# Patient Record
Sex: MALE | Race: WHITE | HISPANIC OR LATINO | URBAN - METROPOLITAN AREA
[De-identification: names, ages, dates, MRNs, and addresses within clinical notes are randomized per-mention and may not be internally consistent; named-entity substitution may affect disease eponyms.]

---

## 2021-01-26 ENCOUNTER — EMERGENCY (EMERGENCY)
Facility: HOSPITAL | Age: 41
LOS: 1 days | Discharge: HOME | End: 2021-01-26
Attending: STUDENT IN AN ORGANIZED HEALTH CARE EDUCATION/TRAINING PROGRAM
Payer: MEDICAID

## 2021-01-26 VITALS
HEIGHT: 64 IN | OXYGEN SATURATION: 96 % | DIASTOLIC BLOOD PRESSURE: 86 MMHG | WEIGHT: 149.91 LBS | TEMPERATURE: 97 F | HEART RATE: 84 BPM | SYSTOLIC BLOOD PRESSURE: 152 MMHG | RESPIRATION RATE: 18 BRPM

## 2021-01-26 PROCEDURE — 99053 MED SERV 10PM-8AM 24 HR FAC: CPT

## 2021-01-26 PROCEDURE — 99283 EMERGENCY DEPT VISIT LOW MDM: CPT

## 2021-01-26 RX ORDER — IBUPROFEN 200 MG
600 TABLET ORAL ONCE
Refills: 0 | Status: COMPLETED | OUTPATIENT
Start: 2021-01-26 | End: 2021-01-26

## 2021-01-26 NOTE — ED ADULT TRIAGE NOTE - CHIEF COMPLAINT QUOTE
Feels like something stuck in his throat after drinking chicken soup at 1500 today.  Tried a shot of bandar at 1500 but it didn't work.  He denies any additional alcohol. Denies drinking alcohol daily.  Alert and oriented X 4.  Oxygen saturation 96%

## 2021-01-27 ENCOUNTER — INPATIENT (INPATIENT)
Facility: HOSPITAL | Age: 41
LOS: 1 days | Discharge: HOME | DRG: 305 | End: 2021-01-29
Attending: HOSPITALIST | Admitting: HOSPITALIST
Payer: MEDICAID

## 2021-01-27 VITALS
HEART RATE: 78 BPM | OXYGEN SATURATION: 98 % | SYSTOLIC BLOOD PRESSURE: 181 MMHG | TEMPERATURE: 98 F | DIASTOLIC BLOOD PRESSURE: 105 MMHG | RESPIRATION RATE: 20 BRPM

## 2021-01-27 DIAGNOSIS — I10 ESSENTIAL (PRIMARY) HYPERTENSION: ICD-10-CM

## 2021-01-27 LAB
ALBUMIN SERPL ELPH-MCNC: 5 G/DL — SIGNIFICANT CHANGE UP (ref 3.3–5.2)
ALP SERPL-CCNC: 72 U/L — SIGNIFICANT CHANGE UP (ref 40–120)
ALT FLD-CCNC: 21 U/L — SIGNIFICANT CHANGE UP
ANION GAP SERPL CALC-SCNC: 11 MMOL/L — SIGNIFICANT CHANGE UP (ref 5–17)
AST SERPL-CCNC: 28 U/L — SIGNIFICANT CHANGE UP
BASOPHILS # BLD AUTO: 0.02 K/UL — SIGNIFICANT CHANGE UP (ref 0–0.2)
BASOPHILS NFR BLD AUTO: 0.4 % — SIGNIFICANT CHANGE UP (ref 0–2)
BILIRUB SERPL-MCNC: 0.7 MG/DL — SIGNIFICANT CHANGE UP (ref 0.4–2)
BUN SERPL-MCNC: 8 MG/DL — SIGNIFICANT CHANGE UP (ref 8–20)
CALCIUM SERPL-MCNC: 9.6 MG/DL — SIGNIFICANT CHANGE UP (ref 8.6–10.2)
CHLORIDE SERPL-SCNC: 99 MMOL/L — SIGNIFICANT CHANGE UP (ref 98–107)
CO2 SERPL-SCNC: 27 MMOL/L — SIGNIFICANT CHANGE UP (ref 22–29)
CREAT SERPL-MCNC: 0.6 MG/DL — SIGNIFICANT CHANGE UP (ref 0.5–1.3)
EOSINOPHIL # BLD AUTO: 0.01 K/UL — SIGNIFICANT CHANGE UP (ref 0–0.5)
EOSINOPHIL NFR BLD AUTO: 0.2 % — SIGNIFICANT CHANGE UP (ref 0–6)
GLUCOSE SERPL-MCNC: 106 MG/DL — HIGH (ref 70–99)
HCT VFR BLD CALC: 43.1 % — SIGNIFICANT CHANGE UP (ref 39–50)
HGB BLD-MCNC: 14.8 G/DL — SIGNIFICANT CHANGE UP (ref 13–17)
IMM GRANULOCYTES NFR BLD AUTO: 0.2 % — SIGNIFICANT CHANGE UP (ref 0–1.5)
LYMPHOCYTES # BLD AUTO: 0.65 K/UL — LOW (ref 1–3.3)
LYMPHOCYTES # BLD AUTO: 13.1 % — SIGNIFICANT CHANGE UP (ref 13–44)
MCHC RBC-ENTMCNC: 33.3 PG — SIGNIFICANT CHANGE UP (ref 27–34)
MCHC RBC-ENTMCNC: 34.3 GM/DL — SIGNIFICANT CHANGE UP (ref 32–36)
MCV RBC AUTO: 97.1 FL — SIGNIFICANT CHANGE UP (ref 80–100)
MONOCYTES # BLD AUTO: 0.49 K/UL — SIGNIFICANT CHANGE UP (ref 0–0.9)
MONOCYTES NFR BLD AUTO: 9.8 % — SIGNIFICANT CHANGE UP (ref 2–14)
NEUTROPHILS # BLD AUTO: 3.8 K/UL — SIGNIFICANT CHANGE UP (ref 1.8–7.4)
NEUTROPHILS NFR BLD AUTO: 76.3 % — SIGNIFICANT CHANGE UP (ref 43–77)
PLATELET # BLD AUTO: 171 K/UL — SIGNIFICANT CHANGE UP (ref 150–400)
POTASSIUM SERPL-MCNC: 3.9 MMOL/L — SIGNIFICANT CHANGE UP (ref 3.5–5.3)
POTASSIUM SERPL-SCNC: 3.9 MMOL/L — SIGNIFICANT CHANGE UP (ref 3.5–5.3)
PROT SERPL-MCNC: 7.4 G/DL — SIGNIFICANT CHANGE UP (ref 6.6–8.7)
RBC # BLD: 4.44 M/UL — SIGNIFICANT CHANGE UP (ref 4.2–5.8)
RBC # FLD: 11.9 % — SIGNIFICANT CHANGE UP (ref 10.3–14.5)
SARS-COV-2 RNA SPEC QL NAA+PROBE: SIGNIFICANT CHANGE UP
SODIUM SERPL-SCNC: 137 MMOL/L — SIGNIFICANT CHANGE UP (ref 135–145)
WBC # BLD: 4.98 K/UL — SIGNIFICANT CHANGE UP (ref 3.8–10.5)
WBC # FLD AUTO: 4.98 K/UL — SIGNIFICANT CHANGE UP (ref 3.8–10.5)

## 2021-01-27 PROCEDURE — U0003: CPT

## 2021-01-27 PROCEDURE — U0005: CPT

## 2021-01-27 PROCEDURE — 93010 ELECTROCARDIOGRAM REPORT: CPT

## 2021-01-27 PROCEDURE — 99285 EMERGENCY DEPT VISIT HI MDM: CPT

## 2021-01-27 PROCEDURE — 99283 EMERGENCY DEPT VISIT LOW MDM: CPT

## 2021-01-27 PROCEDURE — 99222 1ST HOSP IP/OBS MODERATE 55: CPT

## 2021-01-27 PROCEDURE — 71045 X-RAY EXAM CHEST 1 VIEW: CPT | Mod: 26

## 2021-01-27 PROCEDURE — 87631 RESP VIRUS 3-5 TARGETS: CPT

## 2021-01-27 RX ORDER — LABETALOL HCL 100 MG
10 TABLET ORAL ONCE
Refills: 0 | Status: COMPLETED | OUTPATIENT
Start: 2021-01-27 | End: 2021-01-27

## 2021-01-27 RX ADMIN — Medication 600 MILLIGRAM(S): at 00:08

## 2021-01-27 RX ADMIN — Medication 50 MILLIGRAM(S): at 14:29

## 2021-01-27 RX ADMIN — Medication 10 MILLIGRAM(S): at 18:17

## 2021-01-27 RX ADMIN — Medication 2 MILLIGRAM(S): at 14:57

## 2021-01-27 RX ADMIN — Medication 2 MILLIGRAM(S): at 17:31

## 2021-01-27 RX ADMIN — Medication 30 MILLILITER(S): at 00:08

## 2021-01-27 NOTE — ED PROVIDER NOTE - OBJECTIVE STATEMENT
41yo M denies PMH hasn't seen a doctor in 15y presents with dizziness, weakness, blurred vision for 20min this morning, now resolved. Reports he was sitting down when symptoms started. Resolved spontaneously. Denies f/ch, CP, SOB, n/v, abdominal pain. Former smoker, quit a year ago. Reports drinking "a couple beers on weekends", hx illicit drug use but reports he hasn't used in 10 years. 41yo M denies PMH hasn't seen a doctor in 15y presents with dizziness, weakness, blurred vision for 20min this morning, now resolved. Reports he was sitting down when symptoms started. Resolved spontaneously. Denies f/ch, CP, SOB, n/v, abdominal pain. Former smoker, quit a year ago. Reports binge drinking beers on the weekends, last drink Monday night. Reports history of illicit drug use but reports he hasn't used in 10 years.

## 2021-01-27 NOTE — ED PROVIDER NOTE - CLINICAL SUMMARY MEDICAL DECISION MAKING FREE TEXT BOX
41yo M denies PMH hasn't seen a doctor in 15y presents with dizziness, weakness, blurred vision for 20min this morning, now resolved. Tongue fasciculations, tremor on extension, /105, borderline tachycardic at HR 93 shows clinical picture of EtOH withdrawal. Ordered librium. Basic labs due to abscen 39yo M denies PMH hasn't seen a doctor in 15y presents with dizziness, weakness, blurred vision for 20min this morning, now resolved. Tongue fasciculations, tremor on extension, /105, borderline tachycardic at HR 93 shows clinical picture of EtOH withdrawal. Ordered librium. Basic labs due to absence of primary care. COVID swab due to known exposure.

## 2021-01-27 NOTE — ED PROVIDER NOTE - PHYSICAL EXAMINATION
Const: Awake, alert and oriented. In no acute distress. Well appearing.  HEENT: NC/AT. Moist mucous membranes. throat: pharynx clear uuvla is midline, no foreign body noted tolerating secretions not drooling   Eyes: No scleral icterus. EOMI.  Neck:. Soft and supple. Full ROM without pain.  Cardiac: +S1/S2. No murmurs. Peripheral pulses 2+ and symmetric. No LE edema.  Resp: Speaking in full sentences. No evidence of respiratory distress. No wheezes, rales or rhonchi.  Abd: Soft, non-tender, non-distended. Normal bowel sounds in all 4 quadrants. No guarding or rebound.  Back: Spine midline and non-tender. No CVAT.  Skin: No rashes, abrasions or lacerations.  Lymph: No cervical lymphadenopathy.  Neuro: Awake, alert & oriented x 3. Moves all extremities symmetrically.

## 2021-01-27 NOTE — ED PROVIDER NOTE - ATTENDING CONTRIBUTION TO CARE
39 yo male presents after having an abnormal sensation in his throat after dinner. I personally saw the patient with the PA, and completed the key components of the history and physical exam. I then discussed the management plan with the PA.

## 2021-01-27 NOTE — ED PROVIDER NOTE - PHYSICAL EXAMINATION
General: well appearing, NAD  Head: NC/AT  Cardiac: RRR, no m/r/g  Respiratory: CTABL, equal chest wall expansions, no conversational dyspnea  Abdomen: soft, ND, NT  Neuro: AAOx3, +tongue fasciculations, +tremor on arm extension, CN II-XII grossly intact, sensation intact, 5/5 strength  Psych: calm, cooperative, normal affect, answering questions appropriately, clear speech  Skin: warm and dry

## 2021-01-27 NOTE — H&P ADULT - NSHPLABSRESULTS_GEN_ALL_CORE
14.8   4.98  )-----------( 171      ( 27 Jan 2021 14:02 )             43.1       01-27    137  |  99  |  8.0  ----------------------------<  106<H>  3.9   |  27.0  |  0.60    Ca    9.6      27 Jan 2021 14:02    TPro  7.4  /  Alb  5.0  /  TBili  0.7  /  DBili  x   /  AST  28  /  ALT  21  /  AlkPhos  72  01-27        Lactate Trend    CAPILLARY BLOOD GLUCOSE        RADIOLOGY, EKG & ADDITIONAL TESTS: Reviewed.     cxr- clear lungs

## 2021-01-27 NOTE — ED PROVIDER NOTE - CARE PLAN
Principal Discharge DX:	Uncontrolled hypertension  Secondary Diagnosis:	Alcohol withdrawal syndrome without complication

## 2021-01-27 NOTE — ED ADULT NURSE NOTE - OBJECTIVE STATEMENT
Assumed care at 1308 pt co felling a discomfort to his throat, dizziness, SOB and chills today while getting ready to go to work. Pt also co epigastric pain since yesterday. pt denies any chest pain.

## 2021-01-27 NOTE — ED PROVIDER NOTE - PATIENT PORTAL LINK FT
You can access the FollowMyHealth Patient Portal offered by [facility] by registering at the following website: [email or web address]. By joining [employer]’s FollowMyHealth portal, you will also be able to view your health information using other applications (apps) compatible with our system.

## 2021-01-27 NOTE — ED ADULT TRIAGE NOTE - CHIEF COMPLAINT QUOTE
pt a+ox3, BIBA from home c/o flu like symptoms since yesterday. denies sick contacts but reports co-worker tested positive.

## 2021-01-27 NOTE — H&P ADULT - NSHPREVIEWOFSYSTEMS_GEN_ALL_CORE
REVIEW OF SYSTEMS:    CONSTITUTIONAL: No weakness, fevers or chills  EYES/ENT:   No vertigo or throat pain   NECK: No pain or stiffness  RESPIRATORY: No cough, wheezing, hemoptysis; No shortness of breath  CARDIOVASCULAR: No chest pain or palpitations  GASTROINTESTINAL: No abdominal or epigastric pain. No nausea, vomiting, or hematemesis; No diarrhea or constipation. No melena or hematochezia.  GENITOURINARY: No dysuria, frequency or hematuria  NEUROLOGICAL: No numbness or weakness  SKIN: No itching, burning, rashes, or lesions   All other review of systems is negative unless indicated above.

## 2021-01-27 NOTE — H&P ADULT - ASSESSMENT
40M no known pmh presented to ER with dizziness, blurry vision, presyncope found to have hypertensive urgency, possibly etoh withdrawal.        #Hypertension  -improved with IV labetalol  -will start on amlodipine in am  -needs outpatient follow up    #ETOH abuse  -contributing to symptoms, possible withdrawal  -currently scoring low, last drink 2 days ago  -will start on symptom triggered ciwa, if ciwa becomes elevated will start on taper  -seizure and fall precaustions  -hydration    #DVT ppx- none, low risk

## 2021-01-27 NOTE — H&P ADULT - NSHPSOCIALHISTORY_GEN_ALL_CORE
former smoker, quit 1 year ago  former cocaine use, denies use in last 6-7 months  binge drinks on weekends, 8-10 drinks on weekend, but denies drinking during the week.

## 2021-01-27 NOTE — ED ADULT NURSE REASSESSMENT NOTE - NS ED NURSE REASSESS COMMENT FT1
pt in no apparent distress, denies any pain at the moment, plan of care explained, pt verbalized understanding.

## 2021-01-27 NOTE — H&P ADULT - HISTORY OF PRESENT ILLNESS
40M no known pmh presented to ER with dizziness, blurry vision, presyncope. Patient states drinks heavily on weekends. He was drinking this weekend about 8-10 drinks on Saturday and Sunday. Last drink was Monday. He went to work earlier today and suddenly felt dizzy, blurry vision and like he was about to faint. He states didn't eat or drink prior to going to work. He tried to drink some tea to feel better, but continued to feel dizzy so came to ER.     In ER, pt found to be hypertensive to 238/119. Pt initially treated for etoh withdrawal with librium 50 and ativan 2 mg x2. Pt given labetalol 10 mg IV with improvement of blood pressure. Since coming to ER he states he is no longer symptomatic. He states never had etoh withdrawal before, but had similar symptoms two weeks ago after a weekend  40M no known pmh presented to ER with dizziness, blurry vision, presyncope. Patient states drinks heavily on weekends. He was drinking this weekend about 8-10 drinks on Saturday and Sunday. Last drink was Monday. He went to work earlier today and suddenly felt dizzy, blurry vision and like he was about to faint. He states didn't eat or drink prior to going to work. He tried to drink some tea to feel better, but continued to feel dizzy so came to ER.     In ER, pt found to be hypertensive to 238/119. Pt initially treated for etoh withdrawal with librium 50 and ativan 2 mg x2. Pt given labetalol 10 mg IV with improvement of blood pressure. Since coming to ER he states he is no longer symptomatic. He states never had etoh withdrawal before, but had similar symptoms two weeks ago after a weekend of binge drinking.

## 2021-01-27 NOTE — ED PROVIDER NOTE - OBJECTIVE STATEMENT
DISPLAY PLAN FREE TEXT pt is a 39 y/o male presenting to the ed for evaluation. pt states he ate noodle soup, felt that he "choked on the soup" and shortly after started experiencing a itchiness in his throat. pt denies any meat being in the soup/meat bones. pt is tolerating liquids and solids without difficulty. pt requesting covid swab at this time. pt denies cp, sob, headache neck pain visual changes abd pain nausea vomiting numbness or loss of sensation back pain DISPLAY PLAN FREE TEXT DISPLAY PLAN FREE TEXT DISPLAY PLAN FREE TEXT DISPLAY PLAN FREE TEXT DISPLAY PLAN FREE TEXT DISPLAY PLAN FREE TEXT DISPLAY PLAN FREE TEXT

## 2021-01-27 NOTE — H&P ADULT - NSHPPHYSICALEXAM_GEN_ALL_CORE
Vital Signs Last 24 Hrs  T(C): 37 (27 Jan 2021 19:50), Max: 37 (27 Jan 2021 19:50)  T(F): 98.6 (27 Jan 2021 19:50), Max: 98.6 (27 Jan 2021 19:50)  HR: 70 (27 Jan 2021 21:44) (70 - 104)  BP: 124/80 (27 Jan 2021 21:44) (124/80 - 238/116)  BP(mean): --  RR: 18 (27 Jan 2021 21:44) (18 - 20)  SpO2: 99% (27 Jan 2021 21:44) (98% - 99%)    GENERAL: NAD, calm  HEENT:  Atraumatic, Normocephalic, MMM, no oropharyngeal lesions  EYES: EOMI, PERRLA, conjunctival injection  NECK: Supple, No JVD, no throat tenderness.  CHEST/LUNG: Clear to auscultation bilaterally; No wheezes, rales, or rhonchi  HEART: Regular rate and rhythm; No murmurs, rubs, or gallops  ABDOMEN: Soft, Nontender, Nondistended; Bowel sounds present  EXTREMITIES:  2+ Peripheral Pulses, No clubbing, cyanosis, or edema  PSYCH: AAOx3, normal affect  NEUROLOGY: moves all extremities spontaneously. no sensory deficits, no tremor, no pronator drift  SKIN: No rashes or lesions

## 2021-01-27 NOTE — ED PROVIDER NOTE - PROGRESS NOTE DETAILS
Reassessed patient, he is feeling well, denies any current symptoms. Tremor and tongue fasciculations resolved with 2 doses of 2mg ativan, but patient remains hypertensive to 205/112.

## 2021-01-27 NOTE — ED PROVIDER NOTE - NS ED ROS FT
Constitutional: no fever, sweats, and no chills.  Eyes: no pain, no redness, and +visual changes.  ENMT: no neck pain, no stiffness  CV: no chest pain, no edema.  Resp: no cough, no dyspnea  GI: no abdominal pain, no nausea and no vomiting.  : no dysuria, no hematuria  MSK: no msk pain  Skin: no lesions, and no rashes.  Neuro: no LOC, no headache, no sensory deficits, and + weakness.

## 2021-01-28 LAB
SARS-COV-2 IGG SERPL QL IA: POSITIVE
SARS-COV-2 IGM SERPL IA-ACNC: 7.46 INDEX — HIGH

## 2021-01-28 PROCEDURE — 99233 SBSQ HOSP IP/OBS HIGH 50: CPT

## 2021-01-28 RX ORDER — AMLODIPINE BESYLATE 2.5 MG/1
5 TABLET ORAL DAILY
Refills: 0 | Status: DISCONTINUED | OUTPATIENT
Start: 2021-01-28 | End: 2021-01-29

## 2021-01-28 RX ORDER — INFLUENZA VIRUS VACCINE 15; 15; 15; 15 UG/.5ML; UG/.5ML; UG/.5ML; UG/.5ML
0.5 SUSPENSION INTRAMUSCULAR ONCE
Refills: 0 | Status: COMPLETED | OUTPATIENT
Start: 2021-01-28 | End: 2021-01-29

## 2021-01-28 RX ORDER — SODIUM CHLORIDE 9 MG/ML
1000 INJECTION, SOLUTION INTRAVENOUS
Refills: 0 | Status: DISCONTINUED | OUTPATIENT
Start: 2021-01-28 | End: 2021-01-29

## 2021-01-28 RX ADMIN — AMLODIPINE BESYLATE 5 MILLIGRAM(S): 2.5 TABLET ORAL at 06:30

## 2021-01-28 RX ADMIN — SODIUM CHLORIDE 100 MILLILITER(S): 9 INJECTION, SOLUTION INTRAVENOUS at 00:37

## 2021-01-28 RX ADMIN — SODIUM CHLORIDE 100 MILLILITER(S): 9 INJECTION, SOLUTION INTRAVENOUS at 22:22

## 2021-01-28 NOTE — SBIRT NOTE ADULT - NSSBIRTBRIEFINTDET_GEN_A_CORE
used: Oj #[id number] // Provided SBIRT services: Full screen positive. Brief Intervention Performed. Screening results were reviewed with the patient and patient was provided information about healthy guidelines and potential negative consequences associated with level of risk. Motivation and readiness to reduce or stop use was discussed and goals and activities to make changes were suggested/offered.

## 2021-01-28 NOTE — PROGRESS NOTE ADULT - SUBJECTIVE AND OBJECTIVE BOX
SARAH ALEXANDER    [id number]    40y      Male    CC: dizziness    INTERVAL HPI/OVERNIGHT EVENTS: pt seen and examined with . denies blurred vision, ha, dizziness, cp, sob, abd pain, n/v. +small amount loose stool    REVIEW OF SYSTEMS:    CONSTITUTIONAL: No fever, weight loss  RESPIRATORY: No cough, wheezing, hemoptysis; No shortness of breath  CARDIOVASCULAR: No chest pain, palpitations  GASTROINTESTINAL: No abdominal or epigastric pain. No nausea, vomiting  NEUROLOGICAL: No headaches, memory loss, loss of strength.    Vital Signs Last 24 Hrs  T(C): 36.4 (28 Jan 2021 15:46), Max: 37 (27 Jan 2021 19:50)  T(F): 97.5 (28 Jan 2021 15:46), Max: 98.6 (27 Jan 2021 19:50)  HR: 76 (28 Jan 2021 15:46) (70 - 84)  BP: 118/67 (28 Jan 2021 15:46) (118/67 - 180/100)  BP(mean): --  RR: 18 (28 Jan 2021 15:46) (18 - 20)  SpO2: 97% (28 Jan 2021 15:46) (96% - 99%)    PHYSICAL EXAM:    GENERAL: NAD  HEENT: +EOMI  NECK: soft, supple  CHEST/LUNG: Clear to auscultation bilaterally; No wheezing  HEART: S1S2+, Regular rate and rhythm; No murmurs, rubs, or gallops  ABDOMEN: Soft, Nontender, Nondistended; Bowel sounds present  SKIN: warm, dry  NEURO: AAOX3, no focal deficits, no motor or sensory loss  PSYCH: normal affect     LABS:                        14.8   4.98  )-----------( 171      ( 27 Jan 2021 14:02 )             43.1     01-27    137  |  99  |  8.0  ----------------------------<  106<H>  3.9   |  27.0  |  0.60    Ca    9.6      27 Jan 2021 14:02    TPro  7.4  /  Alb  5.0  /  TBili  0.7  /  DBili  x   /  AST  28  /  ALT  21  /  AlkPhos  72  01-27        MEDICATIONS  (STANDING):  amLODIPine   Tablet 5 milliGRAM(s) Oral daily  sodium chloride 0.9% 1000 milliLiter(s) IV Continuous     MEDICATIONS  (PRN):  LORazepam   Injectable 2 milliGRAM(s) IV Push every 2 hours PRN CIWA-Ar score increase by 2 points and a total score of 7 or less  LORazepam   Injectable 2 milliGRAM(s) IV Push every 1 hour PRN CIWA-Ar score 8 or greater      RADIOLOGY & ADDITIONAL TESTS:

## 2021-01-28 NOTE — PROGRESS NOTE ADULT - ASSESSMENT
40y/oM no known PMH presenting with dizziness, blurred vision, presyncope, found to have hypertensive urgency, possible EtOH withdrawal     Hypertension   -s/p IV labetalol   -cont amlodipine   -pt has not seen any physicians, will need outpt f/u     EtOH abuse   -cont IVF x1 day   -symptom-triggered CIWA as needed   -seizure and fall  40y/oM no known PMH presenting with dizziness, blurred vision, presyncope, found to have hypertensive urgency, possible EtOH withdrawal     Hypertension   -s/p IV labetalol   -cont amlodipine   -pt has not seen any physicians, will need outpt f/u     EtOH abuse   -cont IVF x1 day   -symptom-triggered CIWA as needed   -seizure and fall precautions     dispo: pending improvement in BP, poss next 24-48hrs

## 2021-01-29 VITALS
DIASTOLIC BLOOD PRESSURE: 78 MMHG | OXYGEN SATURATION: 96 % | RESPIRATION RATE: 18 BRPM | HEART RATE: 82 BPM | SYSTOLIC BLOOD PRESSURE: 123 MMHG | TEMPERATURE: 97 F

## 2021-01-29 LAB
A1C WITH ESTIMATED AVERAGE GLUCOSE RESULT: 5.2 % — SIGNIFICANT CHANGE UP (ref 4–5.6)
ALBUMIN SERPL ELPH-MCNC: 4.1 G/DL — SIGNIFICANT CHANGE UP (ref 3.3–5.2)
ALP SERPL-CCNC: 59 U/L — SIGNIFICANT CHANGE UP (ref 40–120)
ALT FLD-CCNC: 17 U/L — SIGNIFICANT CHANGE UP
ANION GAP SERPL CALC-SCNC: 13 MMOL/L — SIGNIFICANT CHANGE UP (ref 5–17)
AST SERPL-CCNC: 19 U/L — SIGNIFICANT CHANGE UP
BILIRUB SERPL-MCNC: 1 MG/DL — SIGNIFICANT CHANGE UP (ref 0.4–2)
BUN SERPL-MCNC: 9 MG/DL — SIGNIFICANT CHANGE UP (ref 8–20)
CALCIUM SERPL-MCNC: 8.7 MG/DL — SIGNIFICANT CHANGE UP (ref 8.6–10.2)
CHLORIDE SERPL-SCNC: 102 MMOL/L — SIGNIFICANT CHANGE UP (ref 98–107)
CHOLEST SERPL-MCNC: 142 MG/DL — SIGNIFICANT CHANGE UP
CO2 SERPL-SCNC: 25 MMOL/L — SIGNIFICANT CHANGE UP (ref 22–29)
CREAT SERPL-MCNC: 0.59 MG/DL — SIGNIFICANT CHANGE UP (ref 0.5–1.3)
ESTIMATED AVERAGE GLUCOSE: 103 MG/DL — SIGNIFICANT CHANGE UP (ref 68–114)
GLUCOSE SERPL-MCNC: 88 MG/DL — SIGNIFICANT CHANGE UP (ref 70–99)
HCT VFR BLD CALC: 43.1 % — SIGNIFICANT CHANGE UP (ref 39–50)
HDLC SERPL-MCNC: 51 MG/DL — SIGNIFICANT CHANGE UP
HGB BLD-MCNC: 14.4 G/DL — SIGNIFICANT CHANGE UP (ref 13–17)
LIPID PNL WITH DIRECT LDL SERPL: 75 MG/DL — SIGNIFICANT CHANGE UP
MAGNESIUM SERPL-MCNC: 2.2 MG/DL — SIGNIFICANT CHANGE UP (ref 1.6–2.6)
MCHC RBC-ENTMCNC: 33.1 PG — SIGNIFICANT CHANGE UP (ref 27–34)
MCHC RBC-ENTMCNC: 33.4 GM/DL — SIGNIFICANT CHANGE UP (ref 32–36)
MCV RBC AUTO: 99.1 FL — SIGNIFICANT CHANGE UP (ref 80–100)
NON HDL CHOLESTEROL: 91 MG/DL — SIGNIFICANT CHANGE UP
PLATELET # BLD AUTO: 148 K/UL — LOW (ref 150–400)
POTASSIUM SERPL-MCNC: 4.2 MMOL/L — SIGNIFICANT CHANGE UP (ref 3.5–5.3)
POTASSIUM SERPL-SCNC: 4.2 MMOL/L — SIGNIFICANT CHANGE UP (ref 3.5–5.3)
PROT SERPL-MCNC: 6.6 G/DL — SIGNIFICANT CHANGE UP (ref 6.6–8.7)
RBC # BLD: 4.35 M/UL — SIGNIFICANT CHANGE UP (ref 4.2–5.8)
RBC # FLD: 11.8 % — SIGNIFICANT CHANGE UP (ref 10.3–14.5)
SODIUM SERPL-SCNC: 140 MMOL/L — SIGNIFICANT CHANGE UP (ref 135–145)
TRIGL SERPL-MCNC: 78 MG/DL — SIGNIFICANT CHANGE UP
WBC # BLD: 3.97 K/UL — SIGNIFICANT CHANGE UP (ref 3.8–10.5)
WBC # FLD AUTO: 3.97 K/UL — SIGNIFICANT CHANGE UP (ref 3.8–10.5)

## 2021-01-29 PROCEDURE — 99239 HOSP IP/OBS DSCHRG MGMT >30: CPT

## 2021-01-29 RX ORDER — AMLODIPINE BESYLATE 2.5 MG/1
1 TABLET ORAL
Qty: 30 | Refills: 0
Start: 2021-01-29 | End: 2021-02-27

## 2021-01-29 RX ADMIN — AMLODIPINE BESYLATE 5 MILLIGRAM(S): 2.5 TABLET ORAL at 05:07

## 2021-01-29 RX ADMIN — INFLUENZA VIRUS VACCINE 0.5 MILLILITER(S): 15; 15; 15; 15 SUSPENSION INTRAMUSCULAR at 12:30

## 2021-01-29 NOTE — DISCHARGE NOTE PROVIDER - NSDCCPCAREPLAN_GEN_ALL_CORE_FT
PRINCIPAL DISCHARGE DIAGNOSIS  Diagnosis: Uncontrolled hypertension  Assessment and Plan of Treatment:       SECONDARY DISCHARGE DIAGNOSES  Diagnosis: Alcohol withdrawal syndrome without complication  Assessment and Plan of Treatment:

## 2021-01-29 NOTE — DISCHARGE NOTE PROVIDER - HOSPITAL COURSE
40y/oM no known PMH presenting with dizziness, blurred vision, presyncope, found to have hypertensive urgency, possible EtOH withdrawal. Started on symptom-triggered CIWA, however, not exhibiting further withdrawal symptoms. alcohol cessation encouraged. BP better controlled. Pt stable for discharge home.     Vital Signs Last 24 Hrs  T(C): 36.1 (29 Jan 2021 07:51), Max: 36.7 (29 Jan 2021 05:00)  T(F): 97 (29 Jan 2021 07:51), Max: 98 (29 Jan 2021 05:00)  HR: 82 (29 Jan 2021 07:51) (66 - 82)  BP: 123/78 (29 Jan 2021 07:51) (118/67 - 140/78)  BP(mean): --  RR: 18 (29 Jan 2021 07:51) (18 - 18)  SpO2: 96% (29 Jan 2021 07:51) (95% - 97%)    GENERAL: NAD  HEENT: +EOMI  NECK: soft, supple  CHEST/LUNG: Clear to auscultation bilaterally; No wheezing  HEART: S1S2+, Regular rate and rhythm; No murmurs, rubs, or gallops  ABDOMEN: Soft, Nontender, Nondistended; Bowel sounds present  SKIN: warm, dry  NEURO: AAOX3, no focal deficits, no motor or sensory loss  PSYCH: normal affect     32 minutes spent on discharge

## 2021-01-29 NOTE — DISCHARGE NOTE NURSING/CASE MANAGEMENT/SOCIAL WORK - PATIENT PORTAL LINK FT
You can access the FollowMyHealth Patient Portal offered by [facility] by registering at the following website: [email or web address]. By joining [employer]’s FollowMyHealth portal, you will also be able to view your health information using other applications (apps) compatible with our system.

## 2021-04-11 ENCOUNTER — EMERGENCY (EMERGENCY)
Facility: HOSPITAL | Age: 41
LOS: 1 days | Discharge: HOME | End: 2021-04-11
Attending: EMERGENCY MEDICINE
Payer: MEDICAID

## 2021-04-11 VITALS
SYSTOLIC BLOOD PRESSURE: 158 MMHG | OXYGEN SATURATION: 97 % | DIASTOLIC BLOOD PRESSURE: 104 MMHG | HEIGHT: 64 IN | HEART RATE: 87 BPM | RESPIRATION RATE: 18 BRPM | WEIGHT: 130.07 LBS | TEMPERATURE: 99 F

## 2021-04-11 PROCEDURE — 96372 THER/PROPH/DIAG INJ SC/IM: CPT

## 2021-04-11 PROCEDURE — 99284 EMERGENCY DEPT VISIT MOD MDM: CPT

## 2021-04-11 PROCEDURE — 99283 EMERGENCY DEPT VISIT LOW MDM: CPT | Mod: 25

## 2021-04-11 RX ORDER — DEXAMETHASONE 0.5 MG/5ML
10 ELIXIR ORAL ONCE
Refills: 0 | Status: COMPLETED | OUTPATIENT
Start: 2021-04-11 | End: 2021-04-11

## 2021-04-11 RX ORDER — LIDOCAINE 4 G/100G
10 CREAM TOPICAL ONCE
Refills: 0 | Status: COMPLETED | OUTPATIENT
Start: 2021-04-11 | End: 2021-04-11

## 2021-04-11 RX ADMIN — LIDOCAINE 10 MILLILITER(S): 4 CREAM TOPICAL at 16:18

## 2021-04-11 RX ADMIN — Medication 10 MILLIGRAM(S): at 16:18

## 2021-04-11 NOTE — ED ADULT TRIAGE NOTE - CHIEF COMPLAINT QUOTE
pt reports having a sore throat for 8 days. stated he ate a piece of fish ate days ago " I might have a bone in there or something" PT denies difficulty swallowing but c/o of pain.

## 2021-04-11 NOTE — ED PROVIDER NOTE - PATIENT PORTAL LINK FT
You can access the FollowMyHealth Patient Portal offered by [facility] by registering at the following website: [email or web address]. By joining [employer]’s FollowMyHealth portal, you will also be able to view your health information using other applications (apps) compatible with our system.
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
## 2021-04-11 NOTE — ED PROVIDER NOTE - NSFOLLOWUPINSTRUCTIONS_ED_ALL_ED_FT
SEEK IMMEDIATE MEDICAL CARE IF YOU HAVE ANY OF THE FOLLOWING SYMPTOMS: neck stiffness, drooling, hoarseness or change in voice, inability to swallow liquids, vomiting, or trouble breathing.     Please follow up with ENT within 48 hours
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
## 2021-04-11 NOTE — ED PROVIDER NOTE - CARE PROVIDER_API CALL
Tono Hernandes)  Otolaryngology  [address] [place] [address], [place] [address]  Phone: [phone]  Fax: [phone]  Follow Up Time:

## 2021-12-25 ENCOUNTER — EMERGENCY (EMERGENCY)
Facility: HOSPITAL | Age: 41
LOS: 1 days | Discharge: HOME | End: 2021-12-25
Attending: EMERGENCY MEDICINE
Payer: MEDICAID

## 2021-12-25 VITALS
DIASTOLIC BLOOD PRESSURE: 71 MMHG | HEART RATE: 96 BPM | TEMPERATURE: 99 F | RESPIRATION RATE: 20 BRPM | OXYGEN SATURATION: 96 % | SYSTOLIC BLOOD PRESSURE: 128 MMHG | WEIGHT: 164.91 LBS

## 2021-12-25 PROCEDURE — G1004: CPT

## 2021-12-25 PROCEDURE — 71045 X-RAY EXAM CHEST 1 VIEW: CPT

## 2021-12-25 PROCEDURE — 82962 GLUCOSE BLOOD TEST: CPT

## 2021-12-25 PROCEDURE — 70450 CT HEAD/BRAIN W/O DYE: CPT | Mod: MG

## 2021-12-25 PROCEDURE — 99285 EMERGENCY DEPT VISIT HI MDM: CPT | Mod: 25

## 2021-12-25 PROCEDURE — 71045 X-RAY EXAM CHEST 1 VIEW: CPT | Mod: 26

## 2021-12-25 PROCEDURE — 93010 ELECTROCARDIOGRAM REPORT: CPT

## 2021-12-25 PROCEDURE — 93005 ELECTROCARDIOGRAM TRACING: CPT

## 2021-12-25 PROCEDURE — 70450 CT HEAD/BRAIN W/O DYE: CPT | Mod: 26,MG

## 2021-12-25 PROCEDURE — 99284 EMERGENCY DEPT VISIT MOD MDM: CPT

## 2021-12-25 RX ORDER — ACETAMINOPHEN 500 MG
975 TABLET ORAL ONCE
Refills: 0 | Status: COMPLETED | OUTPATIENT
Start: 2021-12-25 | End: 2021-12-25

## 2021-12-25 RX ADMIN — Medication 975 MILLIGRAM(S): at 20:58

## 2021-12-25 NOTE — ED PROVIDER NOTE - PROGRESS NOTE DETAILS
Resident Joanne Manzo: reassessed pt w/ . Pt reports name Lenny Ye and  1980. Explained imaging results, pt can be d/c'd to care of frined or family member if someone can pick him up. Resident Joanne Manzo: per RN, pt has a friend coming to pick him up. Will d/c pt with return precautions.

## 2021-12-25 NOTE — ED PROVIDER NOTE - PHYSICAL EXAMINATION
General: well appearing, NAD, alcohol on breath  Head: NC/AT  Chest: small area of ecchymosis on sternum. Nonttp  Cardiac: RRR, no m/r/g  Respiratory: CTABL, equal chest wall expansions, no conversational dyspnea  Abdomen: soft, ND, NT  Neuro: AAOx3,coordinated movement of all 4 extremities  Psych: poor hygiene, calm, cooperative, normal affect  Skin: warm and dry except as noted above

## 2021-12-25 NOTE — ED PROVIDER NOTE - PATIENT PORTAL LINK FT
You can access the FollowMyHealth Patient Portal offered by [facility] by registering at the following website: [email or web address]. By joining [employer]’s FollowMyHealth portal, you will also be able to view your health information using other applications (apps) compatible with our system.

## 2021-12-25 NOTE — ED PROVIDER NOTE - NS ED ROS FT
Constitutional: no fever, no sweats  CV: +chest pain, no edema.  Resp: no cough, no dyspnea  GI: no abdominal pain, no nausea and no vomiting.  MSK: no msk pain, no weakness  Skin: no lesions, and no rashes.  Neuro: no LOC, no headache, and no dizziness  ROS otherwise negative except as noted in HPI.
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
## 2021-12-25 NOTE — ED PROVIDER NOTE - NSFOLLOWUPINSTRUCTIONS_ED_ALL_ED_FT
Abuso de alcohol    La intoxicación por alcohol ocurre cuando la cantidad de alcohol que hedy persona ha consumido afecta campbell capacidad para funcionar mental y físicamente. El consumo crónico de alcohol también puede provocar hedy variedad de problemas de deya, denise enfermedades neurológicas, enfermedades del estómago, enfermedades del corazón, enfermedades del hígado, etc. No conduzca después de beber alcohol. Beber suficiente alcohol para terminar en hedy kathie de emergencias sugiere que puede tener un problema de abuso de alcohol. Busque ayuda en un centro de adicción a las drogas.    BUSQUE ATENCIÓN MÉDICA INMEDIATA SI TIENE ALGUNO DE LOS SIGUIENTES SÍNTOMAS: convulsiones, vómitos con nigel, nigel en las heces, aturdimiento / mareos, o temblores o temblores cuando farideh de beber.
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
## 2021-12-29 PROBLEM — Z00.00 ENCOUNTER FOR PREVENTIVE HEALTH EXAMINATION: Status: ACTIVE | Noted: 2021-12-29

## 2023-01-02 ENCOUNTER — EMERGENCY (EMERGENCY)
Facility: HOSPITAL | Age: 43
LOS: 1 days | Discharge: HOME | End: 2023-01-02
Attending: EMERGENCY MEDICINE
Payer: MEDICAID

## 2023-01-02 VITALS
RESPIRATION RATE: 18 BRPM | DIASTOLIC BLOOD PRESSURE: 85 MMHG | HEART RATE: 95 BPM | OXYGEN SATURATION: 95 % | SYSTOLIC BLOOD PRESSURE: 126 MMHG | TEMPERATURE: 98 F

## 2023-01-02 VITALS
HEART RATE: 86 BPM | WEIGHT: 139.99 LBS | OXYGEN SATURATION: 98 % | RESPIRATION RATE: 20 BRPM | SYSTOLIC BLOOD PRESSURE: 140 MMHG | TEMPERATURE: 98 F | DIASTOLIC BLOOD PRESSURE: 70 MMHG

## 2023-01-02 LAB
ALBUMIN SERPL ELPH-MCNC: 4.2 G/DL — SIGNIFICANT CHANGE UP (ref 3.3–5.2)
ALP SERPL-CCNC: 108 U/L — SIGNIFICANT CHANGE UP (ref 40–120)
ALT FLD-CCNC: 52 U/L — HIGH
ANION GAP SERPL CALC-SCNC: 13 MMOL/L — SIGNIFICANT CHANGE UP (ref 5–17)
AST SERPL-CCNC: 74 U/L — HIGH
BASOPHILS # BLD AUTO: 0.01 K/UL — SIGNIFICANT CHANGE UP (ref 0–0.2)
BASOPHILS NFR BLD AUTO: 0.3 % — SIGNIFICANT CHANGE UP (ref 0–2)
BILIRUB SERPL-MCNC: 0.2 MG/DL — LOW (ref 0.4–2)
BUN SERPL-MCNC: 6.9 MG/DL — LOW (ref 8–20)
CALCIUM SERPL-MCNC: 8.3 MG/DL — LOW (ref 8.4–10.5)
CHLORIDE SERPL-SCNC: 105 MMOL/L — SIGNIFICANT CHANGE UP (ref 96–108)
CO2 SERPL-SCNC: 26 MMOL/L — SIGNIFICANT CHANGE UP (ref 22–29)
CREAT SERPL-MCNC: 0.76 MG/DL — SIGNIFICANT CHANGE UP (ref 0.5–1.3)
EGFR: 117 ML/MIN/1.73M2 — SIGNIFICANT CHANGE UP
EOSINOPHIL # BLD AUTO: 0.04 K/UL — SIGNIFICANT CHANGE UP (ref 0–0.5)
EOSINOPHIL NFR BLD AUTO: 1.1 % — SIGNIFICANT CHANGE UP (ref 0–6)
GLUCOSE SERPL-MCNC: 103 MG/DL — HIGH (ref 70–99)
HCT VFR BLD CALC: 41.8 % — SIGNIFICANT CHANGE UP (ref 39–50)
HGB BLD-MCNC: 14.3 G/DL — SIGNIFICANT CHANGE UP (ref 13–17)
IMM GRANULOCYTES NFR BLD AUTO: 0.3 % — SIGNIFICANT CHANGE UP (ref 0–0.9)
LIDOCAIN IGE QN: 93 U/L — HIGH (ref 22–51)
LYMPHOCYTES # BLD AUTO: 1.06 K/UL — SIGNIFICANT CHANGE UP (ref 1–3.3)
LYMPHOCYTES # BLD AUTO: 28.1 % — SIGNIFICANT CHANGE UP (ref 13–44)
MCHC RBC-ENTMCNC: 32.2 PG — SIGNIFICANT CHANGE UP (ref 27–34)
MCHC RBC-ENTMCNC: 34.2 GM/DL — SIGNIFICANT CHANGE UP (ref 32–36)
MCV RBC AUTO: 94.1 FL — SIGNIFICANT CHANGE UP (ref 80–100)
MONOCYTES # BLD AUTO: 0.37 K/UL — SIGNIFICANT CHANGE UP (ref 0–0.9)
MONOCYTES NFR BLD AUTO: 9.8 % — SIGNIFICANT CHANGE UP (ref 2–14)
NEUTROPHILS # BLD AUTO: 2.28 K/UL — SIGNIFICANT CHANGE UP (ref 1.8–7.4)
NEUTROPHILS NFR BLD AUTO: 60.4 % — SIGNIFICANT CHANGE UP (ref 43–77)
PLATELET # BLD AUTO: 135 K/UL — LOW (ref 150–400)
POTASSIUM SERPL-MCNC: 3.6 MMOL/L — SIGNIFICANT CHANGE UP (ref 3.5–5.3)
POTASSIUM SERPL-SCNC: 3.6 MMOL/L — SIGNIFICANT CHANGE UP (ref 3.5–5.3)
PROT SERPL-MCNC: 6.9 G/DL — SIGNIFICANT CHANGE UP (ref 6.6–8.7)
RBC # BLD: 4.44 M/UL — SIGNIFICANT CHANGE UP (ref 4.2–5.8)
RBC # FLD: 13.1 % — SIGNIFICANT CHANGE UP (ref 10.3–14.5)
SODIUM SERPL-SCNC: 144 MMOL/L — SIGNIFICANT CHANGE UP (ref 135–145)
TROPONIN T SERPL-MCNC: <0.01 NG/ML — SIGNIFICANT CHANGE UP (ref 0–0.06)
WBC # BLD: 3.77 K/UL — LOW (ref 3.8–10.5)
WBC # FLD AUTO: 3.77 K/UL — LOW (ref 3.8–10.5)

## 2023-01-02 PROCEDURE — 99283 EMERGENCY DEPT VISIT LOW MDM: CPT

## 2023-01-02 PROCEDURE — 84484 ASSAY OF TROPONIN QUANT: CPT

## 2023-01-02 PROCEDURE — 93005 ELECTROCARDIOGRAM TRACING: CPT

## 2023-01-02 PROCEDURE — 85025 COMPLETE CBC W/AUTO DIFF WBC: CPT

## 2023-01-02 PROCEDURE — 80053 COMPREHEN METABOLIC PANEL: CPT

## 2023-01-02 PROCEDURE — 36415 COLL VENOUS BLD VENIPUNCTURE: CPT

## 2023-01-02 PROCEDURE — 99285 EMERGENCY DEPT VISIT HI MDM: CPT

## 2023-01-02 PROCEDURE — 83690 ASSAY OF LIPASE: CPT

## 2023-01-02 PROCEDURE — 71045 X-RAY EXAM CHEST 1 VIEW: CPT | Mod: 26

## 2023-01-02 PROCEDURE — 93010 ELECTROCARDIOGRAM REPORT: CPT

## 2023-01-02 PROCEDURE — 71045 X-RAY EXAM CHEST 1 VIEW: CPT

## 2023-01-02 RX ORDER — FAMOTIDINE 10 MG/ML
20 INJECTION INTRAVENOUS ONCE
Refills: 0 | Status: COMPLETED | OUTPATIENT
Start: 2023-01-02 | End: 2023-01-02

## 2023-01-02 RX ADMIN — FAMOTIDINE 20 MILLIGRAM(S): 10 INJECTION INTRAVENOUS at 19:05

## 2023-01-02 RX ADMIN — Medication 30 MILLILITER(S): at 19:05

## 2023-01-02 NOTE — ED ADULT TRIAGE NOTE - CHIEF COMPLAINT QUOTE
Patient presents to ER C/O abdominal pain, patient admits to drinking today, calm and cooperative, resp even/unlabored. patient undressed, belongings labeled and secured.

## 2023-01-02 NOTE — ED ADULT NURSE REASSESSMENT NOTE - NS ED NURSE REASSESS COMMENT FT1
Assumed care of pt at 19:15 as stated in report from ASHOK Gresham. Charting as noted. Patient A&O x4, denies pain/discomfort, denies CP/SOB. Updated on the plan of care. Call bell within reach, bed locked in lowest position.

## 2023-01-02 NOTE — ED PROVIDER NOTE - CLINICAL SUMMARY MEDICAL DECISION MAKING FREE TEXT BOX
43 y/o male hx heavy etoh on weekends with several days of some chest/epigastric discomfort with some sob. drank 8 drinks today. no withdrawal and clinically with very mild intox though otherwise appropriate. ekg non ischemic. labs/trop/lipase for acs and pancreatitis evaluation. cxr for intrathoracic causes of pain. symptom control.

## 2023-01-02 NOTE — ED ADULT NURSE NOTE - OBJECTIVE STATEMENT
Pt is a 42YOM who is here for chest/epigastric discomfort, pt states he has had this ongoing for 2-3 days, pt states he was drinking heineken, pt stated initially that he had 7, then increased it to 8 but said most likely 5. Pt states he doesn't drink every day, but he does drink every weekend. Pt states that with the long weekend he did drink for 4 days straight, pt denies any vomiting or nausea, states he has no hx of alcohol withdrawal, pt has no other complaints, pt is A&O4.

## 2023-01-02 NOTE — ED ADULT NURSE NOTE - NSIMPLEMENTINTERV_GEN_ALL_ED
Implemented All Fall Risk Interventions:  [place] to call system. Call bell, personal items and telephone within reach. Instruct patient to call for assistance. Room bathroom lighting operational. Non-slip footwear when patient is off stretcher. Physically safe environment: no spills, clutter or unnecessary equipment. Stretcher in lowest position, wheels locked, appropriate side rails in place. Provide visual cue, wrist band, yellow gown, etc. Monitor gait and stability. Monitor for mental status changes and reorient to person, place, and time. Review medications for side effects contributing to fall risk. Reinforce activity limits and safety measures with patient and family.

## 2023-01-02 NOTE — ED ADULT NURSE NOTE - NS PRO PASSIVE SMOKE EXP
Chronic health problem, taking levothyroxine 88 mcg daily.  Denies skin or hair changes, brain fog, constipation.  Does report some fatigue.  Component      Latest Ref Rng & Units 3/1/2019 3/1/2019           7:56 AM  7:56 AM   TSH      0.380 - 5.330 uIU/mL 0.290 (L)    Free T-4      0.53 - 1.43 ng/dL  1.18     
Patient continues with right ganglion cyst, getting larger.  Denies tenderness at the cyst, but does report aching in radial aspect of right wrist.  Has had cyst for over a year.  Will refer to hand surgery for further evaluation and consideration of removal.    
See additional notes under hypertension.  
This is a chronic health problem that is well controlled with current medications and lifestyle measures.  Taking amlodipine 2.5 mg daily.  Was getting headaches with medication.  Reports headaches have resolved by taking medication at bedtime.  Patient was also taking metoprolol up until 3 months ago.  Discontinued metoprolol because of intermittent lightheadedness.  Was taking metoprolol for palpitations.  Reports palpitations had resolved until a couple weeks ago.  Has had palpitations 2 or 3 times a week for the last 2 weeks, lasting 10 seconds.  Denies chest pain, lightheadedness, shortness of breath during episodes.  Did advise patient that if palpitations continue to occur, then to restart metoprolol 25 mg 3 days a week to see if this helps.  If this does not help with palpitations, patient will start taking metoprolol daily and return to clinic for further evaluation.    
This is a chronic health problem that is well controlled with current medications and lifestyle measures.  Taking citalopram 10 mg daily.  Patient did trial 20 mg daily, but caused increased fatigue.  Discussed nonmedication options including routine aerobic exercise and counseling.  Patient will work on starting exercise routine, declines counseling.  Denies SI/HI.    
No

## 2023-01-02 NOTE — ED ADULT NURSE NOTE - PAIN: PRESENCE, MLM
Lab Results   Component Value Date    HGBA1C 10 6 (H) 09/10/2020       Recent Labs     09/22/20 2109 09/22/20  2149 09/23/20  0639 09/23/20  1047   POCGLU 68 99 122 116       Blood Sugar Average: Last 72 hrs:  (P) 566 [id number]     Poorly-controlled diabetes  Continue current Lantus and Humalog t i d   With meals  Continue insulin sliding scale complains of pain/discomfort

## 2023-01-02 NOTE — ED PROVIDER NOTE - NSFOLLOWUPINSTRUCTIONS_ED_ALL_ED_FT
Abuso de alcohol    La intoxicación por alcohol ocurre cuando la cantidad de alcohol que hedy persona ha consumido afecta campbell capacidad para funcionar mental y físicamente. El consumo crónico de alcohol también puede provocar hedy variedad de problemas de deya, denise enfermedades neurológicas, enfermedades del estómago, enfermedades del corazón, enfermedades del hígado, etc. No conduzca después de beber alcohol. Beber suficiente alcohol para terminar en hedy kathie de emergencias sugiere que puede tener un problema de abuso de alcohol. Busque ayuda en un centro de adicción a las drogas.    BUSQUE ATENCIÓN MÉDICA INMEDIATA SI TIENE ALGUNO DE LOS SIGUIENTES SÍNTOMAS: convulsiones, vómitos con nigel, nigel en las heces, aturdimiento / mareos, o temblores o temblores cuando farideh de beber.     Dolor de pecho    El dolor de pecho puede ser causado por muchas condiciones diferentes que pueden ser peligrosas o no. Las causas incluyen acidez estomacal, infecciones pulmonares, infarto de miocardio, coágulos de nigel en los pulmones, infecciones de la piel, distensión o daño a los músculos, cartílagos o huesos, etc. Además de la historia clínica y el examen físico, puede realizarse un electrocardiograma (ECG) u otras pruebas de laboratorio se garrido realizado para determinar la causa de campbell dolor de pecho. Garry un seguimiento con campbell proveedor de atención primaria o con un cardiólogo según las instrucciones.    BUSQUE ATENCIÓN MÉDICA INMEDIATA SI TIENE ALGUNO DE LOS SIGUIENTES SÍNTOMAS: empeoramiento del dolor en el pecho, tos con nigel, dolor inexplicable de espalda / jayna / mandíbula, dolor abdominal intenso, mareos o aturdimiento, desmayos, dificultad para respirar, piel sudorosa o húmeda, vómitos, o latidos cardíacos acelerados. Estos síntomas pueden representar un problema grave que sea hedy emergencia. No espere a chris si los síntomas desaparecen. Obtenga ayuda médica de inmediato. Llame al 911 y no conduzca al hospital.

## 2023-01-02 NOTE — ED PROVIDER NOTE - OBJECTIVE STATEMENT
ED inerpreter Eliu 43 yo male denies pmh c/o chest/epigastric discomfort with some sob over past 2-3 days. states drank ~8 drinks today and drinks only weekends including this weekend (extended to holiday weekend). denies hx withdrawal. denies vomiting, no hematemesis/brbpr. denies other complaints. states doesn't want to be here and has improvement but states will stay for labs.

## 2023-01-02 NOTE — ED PROVIDER NOTE - PATIENT PORTAL LINK FT
You can access the FollowMyHealth Patient Portal offered by [facility] by registering at the following website: [email or web address]. By joining [employer]’s FollowMyHealth portal, you will also be able to view your health information using other applications (apps) compatible with our system.

## 2023-01-03 PROBLEM — Z78.9 OTHER SPECIFIED HEALTH STATUS: Chronic | Status: ACTIVE | Noted: 2021-01-28

## 2023-01-03 PROBLEM — Z78.9 OTHER SPECIFIED HEALTH STATUS: Chronic | Status: ACTIVE | Noted: 2021-02-02

## 2024-02-22 NOTE — ED PROVIDER NOTE - ATTENDING CONTRIBUTION TO CARE
Pt BIBA from home, c/o swelling to right arm x 1 week, hx of afib on blood thinners, bradycardic upon ED arrival, pt unsure if that's happened before Pt. awake and alert. No acute distress. Pt. tolerating PO in the ED. Post. pharynx is clear. I, Dr. Cee, performed a face to face bedside interview with this patient regarding history of present illness, review of symptoms and relevant past medical, social and family history.  I completed an independent physical examination.  I have also reviewed the ACP's note(s) and discussed the plan with the ACP.

## 2025-06-30 ENCOUNTER — EMERGENCY (EMERGENCY)
Facility: HOSPITAL | Age: 45
LOS: 1 days | End: 2025-06-30
Attending: STUDENT IN AN ORGANIZED HEALTH CARE EDUCATION/TRAINING PROGRAM
Payer: SELF-PAY

## 2025-06-30 VITALS
SYSTOLIC BLOOD PRESSURE: 123 MMHG | RESPIRATION RATE: 18 BRPM | OXYGEN SATURATION: 95 % | DIASTOLIC BLOOD PRESSURE: 76 MMHG | WEIGHT: 134.92 LBS | TEMPERATURE: 97 F | HEART RATE: 77 BPM

## 2025-06-30 VITALS
SYSTOLIC BLOOD PRESSURE: 107 MMHG | HEART RATE: 68 BPM | RESPIRATION RATE: 18 BRPM | TEMPERATURE: 98 F | OXYGEN SATURATION: 97 % | DIASTOLIC BLOOD PRESSURE: 63 MMHG

## 2025-06-30 PROCEDURE — 99283 EMERGENCY DEPT VISIT LOW MDM: CPT

## 2025-06-30 PROCEDURE — 99285 EMERGENCY DEPT VISIT HI MDM: CPT

## 2025-06-30 PROCEDURE — 99053 MED SERV 10PM-8AM 24 HR FAC: CPT

## 2025-06-30 NOTE — ED PROVIDER NOTE - PATIENT PORTAL LINK FT
You can access the FollowMyHealth Patient Portal offered by [facility] by registering at the following website: [email or web address]. By joining [employer]’s FollowMyHealth portal, you will also be able to view your health information using other applications (apps) compatible with our system.

## 2025-06-30 NOTE — ED PROVIDER NOTE - NSFOLLOWUPINSTRUCTIONS_ED_ALL_ED_FT
Had to change PCA pump due to previous pump not registering patient button. Settings stayed the same, changed pump with SALAZAR Puentes RN. Totals for the time were drug total of 1.5mg, total demands 60, and delivered 13 doses at 2220.   Intoxicación alcohólica  Alcohol Intoxication  La intoxicación alcohólica ocurre cuando no se puede pensar con claridad ni desempeñar nakul después de consumir bebidas alcohólicas. [place] puede ocurrir después de solo hedy copa. El efecto que el alcohol tiene en campbell manera de pensar y actuar depende de:  La cantidad de alcohol que epifanio.  La edad, el peso y si es hombre o katie.  La frecuencia con la que consume alcohol.  Si tiene otros problemas médicos.  La intoxicación alcohólica puede variar de leve a grave. Puede ser peligrosa, especialmente si:  Epifanio hedy gran cantidad de alcohol en un corto periodo de tiempo (consume alcohol compulsivamente).  Epifanio alcohol y consume drogas o medicamentos.  ¿Cuáles son las causas?  La causa de esta afección es el consumo de alcohol.    ¿Qué incrementa el riesgo?  Presión de los pares en los adultos jóvenes.  Dificultad para manejar el estrés.  Antecedentes de consumo excesivo de drogas o alcohol.  Beber alcohol o consumir drogas.  Antecedentes familiares de consumo excesivo de alcohol o drogas.  Peso corporal bajo.  Consumo de alcohol compulsivo.  ¿Cuáles son los signos o síntomas?  Sensación de relajación o somnolencia.  Tener hedy leve dificultad con la coordinación, el habla, la memoria o la atención.  Jesu esequiel o tristeza intensa.  Tener hedy seria dificultad con la coordinación, el habla, la memoria o la atención.  Otros síntomas pueden incluir:  Desmayo.  Vómitos.  Confusión.  Respiración lenta.  Coma.  ¿Cómo se trata?  El tratamiento de esta afección puede incluir:  Monitoreo exhaustivo en el hospital.  Líquidos intravenosos (i.v.) para agregar agua al cuerpo.  Medicamentos para tratar los vómitos o para eliminar el alcohol del cuerpo.  Asesoramiento sobre los peligros de alcohol.  Oxigenoterapia o hedy máquina para respirar (respirador).  También pueden tratarle el trastorno por consumo de sustancias.    Siga estas instrucciones en campbell casa:  Comida y bebida    A 12-ounce bottle of beer, a 5-ounce glass of wine, and a 1.5-ounce shot of hard liquor.  No chris alcohol si:  El médico le indica que no lo miller.  Está embarazada, puede estar embarazada o está tratando de quedar embarazada.  No tiene la edad legal para beber (mayor de 21 años de edad en los [place]).  Está tomando medicamentos que no se deben mezclar con alcohol.  El alcohol hace que campbell problema médico empeore.  Tiene que conducir o realizar actividades que requieren que esté alerta.  Tiene un trastorno por abuso de sustancias. [place] se produce cuando el consumo repetido de alcohol le causa problemas en campbell deya, jt relaciones o con jt obligaciones en el trabajo, el hogar o la escuela.  Pregúntele a campbell médico si el alcohol es seguro para usted. Si le permiten beber, limite la cantidad que consume a lo siguiente:  De 0 a 1 medida por día para las mujeres que no están embarazadas.  De 0 a 2 medidas por día para los hombres.  Sepa cuánta cantidad de alcohol hay en las bebidas que seema. En los [place], hedy medida equivale a hedy botella de cerveza de 12 oz (355 ml), un vaso de vino de 5 oz (148 ml) o un vaso de hedy bebida alcohólica de saritha graduación de 1½ oz (44 ml).  Asegúrese de comer antes de beber alcohol.  El alcohol aumenta la micción. Es importante mantenerse hidratado y evitar la cafeína.  La cafeína puede encontrarse en el café, el té y algunos refrescos.  La cafeína puede hacerlo sentir sed.  No chris más de hedy medida de alcohol por hora.  Si va a beber más de hedy medida de alcohol, tome hedy bebida sin alcohol (denise agua) entre las bebidas alcohólicas.  Instrucciones generales    A sign showing that a person should not drive.  Bogota los medicamentos de venta coral y los recetados solamente denise se lo haya indicado el médico.  No conduzca después de beber cualquier cantidad de alcohol. Organice un conductor designado u otra forma de volver a casa.  Pídale a alguna persona de confianza que se quede con usted mientras está intoxicado. Nodebería quedarse solo.  Comuníquese con un médico si:  No mejora luego de algunos días.  Tiene problemas en el trabajo, la escuela o el hogar debido al consumo de alcohol.  Tiene dificultad con lo siguiente:  El movimiento.  El habla.  La memoria.  Prestar atención a las cosas.  Solicite ayuda de inmediato si:  Tiene dificultad para mantenerse despierto.  Se siente mareado o se desmaya.  Se siente muy confundido.  Tiene dificultad para mantenerse despierto.  Vomita nigel. La nigel puede ser de color jarrett brillante o similar al sedimento del café.  Le garrido dicho que ha tenido sacudones que no puede controlar (convulsiones).  Observa nigel en las heces (materia fecal). La nigel:  Puede ser de color jarrett brillante.  Puede hacer que las heces sugey negras y alquitranadas, y que huelan mal.  Estos síntomas pueden indicar hedy emergencia. Solicite ayuda de inmediato. Llame al 911.  No espere a chris si los síntomas desaparecen.  No conduzca por jt propios medios hasta el hospital.  También solicite ayuda de inmediato si:  Tiene pensamientos acerca de lastimarse o lastimar a otras personas.  Siga alguno de estos pasos si siente que puede lastimarse o lastimar a otras personas, o tiene pensamientos de poner fin a campbell annie:  Llame al 911.  Llame a National Suicide Prevention Lifeline ([place] Nacional para la Prevención del Suicidio) al 1-761.440.1377 o al 988. Está disponible las 24 horas del día.  Envíe un mensaje de texto a la línea para casos de crisis al [id number].  Resumen  La intoxicación alcohólica ocurre cuando no se puede pensar con claridad ni desempeñar nakul después de consumir bebidas alcohólicas. [place] puede ocurrir después de solo hedy copa.  Si el médico le dice que el alcohol es seguro para usted, limite la cantidad que consume.  Comuníquese con un médico si tiene problemas en el trabajo, la escuela o el hogar debido al consumo de alcohol.  Busque ayuda de inmediato si tiene pensamientos acerca de lastimarse a usted mismo o a otras personas.  Esta información no tiene denise fin reemplazar el consejo del médico. Asegúrese de hacerle al médico cualquier pregunta que tenga.

## 2025-06-30 NOTE — ED PROVIDER NOTE - IV ALTEPLASE ADMIN OUTSIDE HIDDEN
Writer left pt a detailed message to call our clinic to confirm she had received our previous message and is now taking metoprolol xl 50 mg twice daily.  Office number was provided.   show

## 2025-06-30 NOTE — ED PROVIDER NOTE - CLINICAL SUMMARY MEDICAL DECISION MAKING FREE TEXT BOX
46 yo M no pmh present with Presents for intoxication.  Patient states he had 5 beers today, was outside his friend's house waiting for [facility] called EMS who brought patient to the ED.  Patient states he does not drink daily, no history of alcohol withdrawal.  Patient states he has to go to work in the morning.On exam, vital signs noted heart rate regular no appreciable murmurs rubs or gallops, lungs clear to auscultation throughout abdomen soft and nontender, pupils equal round reactive to light, no obvious trauma.  Patient now clinically sober, ambulatory with steady gait tolerating p.o.  Stable for discharge with outpatient follow-up.  Patient states he will take a cab home.  Provided with number by rn